# Patient Record
Sex: FEMALE | Race: WHITE | NOT HISPANIC OR LATINO | Employment: OTHER | URBAN - METROPOLITAN AREA
[De-identification: names, ages, dates, MRNs, and addresses within clinical notes are randomized per-mention and may not be internally consistent; named-entity substitution may affect disease eponyms.]

---

## 2018-10-05 ENCOUNTER — RECORDS - HEALTHEAST (OUTPATIENT)
Dept: LAB | Facility: CLINIC | Age: 83
End: 2018-10-05

## 2018-10-05 LAB
ANION GAP SERPL CALCULATED.3IONS-SCNC: 10 MMOL/L (ref 5–18)
BUN SERPL-MCNC: 44 MG/DL (ref 8–28)
CALCIUM SERPL-MCNC: 9.3 MG/DL (ref 8.5–10.5)
CHLORIDE BLD-SCNC: 107 MMOL/L (ref 98–107)
CO2 SERPL-SCNC: 27 MMOL/L (ref 22–31)
CREAT SERPL-MCNC: 2.32 MG/DL (ref 0.6–1.1)
GFR SERPL CREATININE-BSD FRML MDRD: 20 ML/MIN/1.73M2
GLUCOSE BLD-MCNC: 70 MG/DL (ref 70–125)
POTASSIUM BLD-SCNC: 4.3 MMOL/L (ref 3.5–5)
SODIUM SERPL-SCNC: 144 MMOL/L (ref 136–145)

## 2018-11-28 ENCOUNTER — RECORDS - HEALTHEAST (OUTPATIENT)
Dept: LAB | Facility: CLINIC | Age: 83
End: 2018-11-28

## 2018-11-28 LAB
ANION GAP SERPL CALCULATED.3IONS-SCNC: 6 MMOL/L (ref 5–18)
BUN SERPL-MCNC: 45 MG/DL (ref 8–28)
CALCIUM SERPL-MCNC: 9.6 MG/DL (ref 8.5–10.5)
CHLORIDE BLD-SCNC: 106 MMOL/L (ref 98–107)
CO2 SERPL-SCNC: 30 MMOL/L (ref 22–31)
CREAT SERPL-MCNC: 2.47 MG/DL (ref 0.6–1.1)
GFR SERPL CREATININE-BSD FRML MDRD: 18 ML/MIN/1.73M2
GLUCOSE BLD-MCNC: 82 MG/DL (ref 70–125)
POTASSIUM BLD-SCNC: 4.3 MMOL/L (ref 3.5–5)
SODIUM SERPL-SCNC: 142 MMOL/L (ref 136–145)

## 2019-01-25 ENCOUNTER — RECORDS - HEALTHEAST (OUTPATIENT)
Dept: LAB | Facility: CLINIC | Age: 84
End: 2019-01-25

## 2019-01-25 LAB
ANION GAP SERPL CALCULATED.3IONS-SCNC: 9 MMOL/L (ref 5–18)
BUN SERPL-MCNC: 29 MG/DL (ref 8–28)
CALCIUM SERPL-MCNC: 9.1 MG/DL (ref 8.5–10.5)
CHLORIDE BLD-SCNC: 107 MMOL/L (ref 98–107)
CO2 SERPL-SCNC: 26 MMOL/L (ref 22–31)
CREAT SERPL-MCNC: 2.1 MG/DL (ref 0.6–1.1)
GFR SERPL CREATININE-BSD FRML MDRD: 22 ML/MIN/1.73M2
GLUCOSE BLD-MCNC: 109 MG/DL (ref 70–125)
POTASSIUM BLD-SCNC: 4.1 MMOL/L (ref 3.5–5)
SODIUM SERPL-SCNC: 142 MMOL/L (ref 136–145)

## 2019-10-28 ENCOUNTER — RECORDS - HEALTHEAST (OUTPATIENT)
Dept: LAB | Facility: CLINIC | Age: 84
End: 2019-10-28

## 2019-10-28 LAB
ANION GAP SERPL CALCULATED.3IONS-SCNC: 9 MMOL/L (ref 5–18)
BUN SERPL-MCNC: 33 MG/DL (ref 8–28)
CALCIUM SERPL-MCNC: 9.5 MG/DL (ref 8.5–10.5)
CHLORIDE BLD-SCNC: 104 MMOL/L (ref 98–107)
CO2 SERPL-SCNC: 29 MMOL/L (ref 22–31)
CREAT SERPL-MCNC: 2.17 MG/DL (ref 0.6–1.1)
ERYTHROCYTE [DISTWIDTH] IN BLOOD BY AUTOMATED COUNT: 13.4 % (ref 11–14.5)
GFR SERPL CREATININE-BSD FRML MDRD: 21 ML/MIN/1.73M2
GLUCOSE BLD-MCNC: 78 MG/DL (ref 70–125)
HCT VFR BLD AUTO: 36.6 % (ref 35–47)
HGB BLD-MCNC: 11.6 G/DL (ref 12–16)
MCH RBC QN AUTO: 32.8 PG (ref 27–34)
MCHC RBC AUTO-ENTMCNC: 31.7 G/DL (ref 32–36)
MCV RBC AUTO: 103 FL (ref 80–100)
PLATELET # BLD AUTO: 189 THOU/UL (ref 140–440)
PMV BLD AUTO: 11 FL (ref 8.5–12.5)
POTASSIUM BLD-SCNC: 4.1 MMOL/L (ref 3.5–5)
RBC # BLD AUTO: 3.54 MILL/UL (ref 3.8–5.4)
SODIUM SERPL-SCNC: 142 MMOL/L (ref 136–145)
TSH SERPL DL<=0.005 MIU/L-ACNC: 0.43 UIU/ML (ref 0.3–5)
WBC: 6.7 THOU/UL (ref 4–11)

## 2019-11-07 ENCOUNTER — HEALTH MAINTENANCE LETTER (OUTPATIENT)
Age: 84
End: 2019-11-07

## 2019-11-14 ENCOUNTER — AMBULATORY - HEALTHEAST (OUTPATIENT)
Dept: OTHER | Facility: CLINIC | Age: 84
End: 2019-11-14

## 2019-11-14 ENCOUNTER — DOCUMENTATION ONLY (OUTPATIENT)
Dept: OTHER | Facility: CLINIC | Age: 84
End: 2019-11-14

## 2020-01-01 ENCOUNTER — RECORDS - HEALTHEAST (OUTPATIENT)
Dept: LAB | Facility: CLINIC | Age: 85
End: 2020-01-01

## 2020-01-01 ENCOUNTER — HEALTH MAINTENANCE LETTER (OUTPATIENT)
Age: 85
End: 2020-01-01

## 2020-01-01 LAB
ALBUMIN UR-MCNC: NEGATIVE MG/DL
ANION GAP SERPL CALCULATED.3IONS-SCNC: 10 MMOL/L (ref 5–18)
APPEARANCE UR: CLEAR
BACTERIA #/AREA URNS HPF: ABNORMAL HPF
BACTERIA SPEC CULT: NO GROWTH
BASOPHILS # BLD AUTO: 0.2 THOU/UL (ref 0–0.2)
BASOPHILS NFR BLD AUTO: 2 % (ref 0–2)
BILIRUB UR QL STRIP: NEGATIVE
BUN SERPL-MCNC: 46 MG/DL (ref 8–28)
CALCIUM SERPL-MCNC: 9 MG/DL (ref 8.5–10.5)
CHLORIDE BLD-SCNC: 106 MMOL/L (ref 98–107)
CO2 SERPL-SCNC: 25 MMOL/L (ref 22–31)
COLOR UR AUTO: ABNORMAL
CREAT SERPL-MCNC: 2.62 MG/DL (ref 0.6–1.1)
EOSINOPHIL # BLD AUTO: 0.9 THOU/UL (ref 0–0.4)
EOSINOPHIL NFR BLD AUTO: 14 % (ref 0–6)
ERYTHROCYTE [DISTWIDTH] IN BLOOD BY AUTOMATED COUNT: 13.2 % (ref 11–14.5)
GFR SERPL CREATININE-BSD FRML MDRD: 17 ML/MIN/1.73M2
GLUCOSE BLD-MCNC: 86 MG/DL (ref 70–125)
GLUCOSE UR STRIP-MCNC: NEGATIVE MG/DL
HCT VFR BLD AUTO: 34.4 % (ref 35–47)
HGB BLD-MCNC: 10.8 G/DL (ref 12–16)
HGB UR QL STRIP: ABNORMAL
KETONES UR STRIP-MCNC: NEGATIVE MG/DL
LEUKOCYTE ESTERASE UR QL STRIP: ABNORMAL
LYMPHOCYTES # BLD AUTO: 2.4 THOU/UL (ref 0.8–4.4)
LYMPHOCYTES NFR BLD AUTO: 37 % (ref 20–40)
MCH RBC QN AUTO: 32.4 PG (ref 27–34)
MCHC RBC AUTO-ENTMCNC: 31.4 G/DL (ref 32–36)
MCV RBC AUTO: 103 FL (ref 80–100)
MONOCYTES # BLD AUTO: 0.9 THOU/UL (ref 0–0.9)
MONOCYTES NFR BLD AUTO: 14 % (ref 2–10)
NEUTROPHILS # BLD AUTO: 2.2 THOU/UL (ref 2–7.7)
NEUTROPHILS NFR BLD AUTO: 33 % (ref 50–70)
NITRATE UR QL: NEGATIVE
PH UR STRIP: 5.5 [PH] (ref 4.5–8)
PLATELET # BLD AUTO: 184 THOU/UL (ref 140–440)
PMV BLD AUTO: 11 FL (ref 8.5–12.5)
POTASSIUM BLD-SCNC: 3.6 MMOL/L (ref 3.5–5)
RBC # BLD AUTO: 3.33 MILL/UL (ref 3.8–5.4)
RBC #/AREA URNS AUTO: ABNORMAL HPF
SODIUM SERPL-SCNC: 141 MMOL/L (ref 136–145)
SP GR UR STRIP: 1.01 (ref 1–1.03)
SQUAMOUS #/AREA URNS AUTO: ABNORMAL LPF
TSH SERPL DL<=0.005 MIU/L-ACNC: 2.48 UIU/ML (ref 0.3–5)
UROBILINOGEN UR STRIP-ACNC: ABNORMAL
WBC #/AREA URNS AUTO: ABNORMAL HPF
WBC: 6.6 THOU/UL (ref 4–11)

## 2020-02-23 ENCOUNTER — HEALTH MAINTENANCE LETTER (OUTPATIENT)
Age: 85
End: 2020-02-23

## 2020-04-22 ENCOUNTER — RECORDS - HEALTHEAST (OUTPATIENT)
Dept: LAB | Facility: CLINIC | Age: 85
End: 2020-04-22

## 2020-04-22 LAB
SARS-COV-2 PCR COMMENT: NORMAL
SARS-COV-2 RNA SPEC QL NAA+PROBE: NEGATIVE
SARS-COV-2 VIRUS SPECIMEN SOURCE: NORMAL

## 2021-01-01 ENCOUNTER — RECORDS - HEALTHEAST (OUTPATIENT)
Dept: ADMINISTRATIVE | Facility: CLINIC | Age: 86
End: 2021-01-01

## 2021-01-01 ENCOUNTER — RECORDS - HEALTHEAST (OUTPATIENT)
Dept: LAB | Facility: CLINIC | Age: 86
End: 2021-01-01

## 2021-01-01 ENCOUNTER — COMMUNICATION - HEALTHEAST (OUTPATIENT)
Dept: GERIATRICS | Facility: CLINIC | Age: 86
End: 2021-01-01

## 2021-01-01 ENCOUNTER — AMBULATORY - HEALTHEAST (OUTPATIENT)
Dept: GERIATRICS | Facility: CLINIC | Age: 86
End: 2021-01-01

## 2021-01-01 ENCOUNTER — HEALTH MAINTENANCE LETTER (OUTPATIENT)
Age: 86
End: 2021-01-01

## 2021-01-01 ENCOUNTER — OFFICE VISIT - HEALTHEAST (OUTPATIENT)
Dept: GERIATRICS | Facility: CLINIC | Age: 86
End: 2021-01-01

## 2021-01-01 DIAGNOSIS — G89.29 CHRONIC BILATERAL BACK PAIN, UNSPECIFIED BACK LOCATION: ICD-10-CM

## 2021-01-01 DIAGNOSIS — R26.2 INABILITY TO AMBULATE DUE TO MULTIPLE JOINTS: ICD-10-CM

## 2021-01-01 DIAGNOSIS — M54.9 CHRONIC BILATERAL BACK PAIN, UNSPECIFIED BACK LOCATION: ICD-10-CM

## 2021-01-01 DIAGNOSIS — E03.9 ACQUIRED HYPOTHYROIDISM: ICD-10-CM

## 2021-01-01 DIAGNOSIS — N18.4 CKD (CHRONIC KIDNEY DISEASE) STAGE 4, GFR 15-29 ML/MIN (H): ICD-10-CM

## 2021-01-01 DIAGNOSIS — I10 ESSENTIAL HYPERTENSION: ICD-10-CM

## 2021-01-01 DIAGNOSIS — R25.9 ABNORMAL INVOLUNTARY MOVEMENT: ICD-10-CM

## 2021-01-01 LAB
25(OH)D3 SERPL-MCNC: 39 NG/ML (ref 30–80)
ALBUMIN UR-MCNC: NEGATIVE G/DL
ALBUMIN UR-MCNC: NEGATIVE MG/DL
ANION GAP SERPL CALCULATED.3IONS-SCNC: 10 MMOL/L (ref 5–18)
ANION GAP SERPL CALCULATED.3IONS-SCNC: 7 MMOL/L (ref 5–18)
APPEARANCE UR: CLEAR
APPEARANCE UR: CLEAR
BACTERIA #/AREA URNS HPF: ABNORMAL /[HPF]
BACTERIA #/AREA URNS HPF: ABNORMAL HPF
BACTERIA SPEC CULT: NO GROWTH
BACTERIA SPEC CULT: NO GROWTH
BASOPHILS # BLD AUTO: 0.1 THOU/UL (ref 0–0.2)
BASOPHILS NFR BLD AUTO: 2 % (ref 0–2)
BILIRUB UR QL STRIP: NEGATIVE
BILIRUB UR QL STRIP: NEGATIVE
BUN SERPL-MCNC: 33 MG/DL (ref 8–28)
BUN SERPL-MCNC: 39 MG/DL (ref 8–28)
CALCIUM SERPL-MCNC: 8.9 MG/DL (ref 8.5–10.5)
CALCIUM SERPL-MCNC: 9.3 MG/DL (ref 8.5–10.5)
CHLORIDE BLD-SCNC: 108 MMOL/L (ref 98–107)
CHLORIDE BLD-SCNC: 108 MMOL/L (ref 98–107)
CO2 SERPL-SCNC: 24 MMOL/L (ref 22–31)
CO2 SERPL-SCNC: 28 MMOL/L (ref 22–31)
COLOR UR AUTO: ABNORMAL
COLOR UR AUTO: ABNORMAL
CREAT SERPL-MCNC: 1.94 MG/DL (ref 0.6–1.1)
CREAT SERPL-MCNC: 2.04 MG/DL (ref 0.6–1.1)
EOSINOPHIL # BLD AUTO: 0.8 THOU/UL (ref 0–0.4)
EOSINOPHIL NFR BLD AUTO: 12 % (ref 0–6)
ERYTHROCYTE [DISTWIDTH] IN BLOOD BY AUTOMATED COUNT: 13.2 % (ref 11–14.5)
ERYTHROCYTE [DISTWIDTH] IN BLOOD BY AUTOMATED COUNT: 13.3 % (ref 11–14.5)
GFR SERPL CREATININE-BSD FRML MDRD: 23 ML/MIN/1.73M2
GFR SERPL CREATININE-BSD FRML MDRD: 24 ML/MIN/1.73M2
GLUCOSE BLD-MCNC: 79 MG/DL (ref 70–125)
GLUCOSE BLD-MCNC: 85 MG/DL (ref 70–125)
GLUCOSE UR STRIP-MCNC: NEGATIVE MG/DL
GLUCOSE UR STRIP-MCNC: NEGATIVE MG/DL
HCT VFR BLD AUTO: 34.5 % (ref 35–47)
HCT VFR BLD AUTO: 35 % (ref 35–47)
HGB BLD-MCNC: 11.1 G/DL (ref 12–16)
HGB BLD-MCNC: 11.5 G/DL (ref 12–16)
HGB UR QL STRIP: NEGATIVE
HGB UR QL STRIP: NEGATIVE
HYALINE CASTS #/AREA URNS LPF: ABNORMAL LPF
HYALINE CASTS: 12 LPF
IMM GRANULOCYTES # BLD: 0 THOU/UL
IMM GRANULOCYTES NFR BLD: 0 %
KETONES UR STRIP-MCNC: NEGATIVE MG/DL
KETONES UR STRIP-MCNC: NEGATIVE MG/DL
LEUKOCYTE ESTERASE UR QL STRIP: ABNORMAL
LEUKOCYTE ESTERASE UR QL STRIP: ABNORMAL
LYMPHOCYTES # BLD AUTO: 2.9 THOU/UL (ref 0.8–4.4)
LYMPHOCYTES NFR BLD AUTO: 44 % (ref 20–40)
MCH RBC QN AUTO: 32.7 PG (ref 27–34)
MCH RBC QN AUTO: 33.8 PG (ref 27–34)
MCHC RBC AUTO-ENTMCNC: 31.7 G/DL (ref 32–36)
MCHC RBC AUTO-ENTMCNC: 33.3 G/DL (ref 32–36)
MCV RBC AUTO: 102 FL (ref 80–100)
MCV RBC AUTO: 103 FL (ref 80–100)
MONOCYTES # BLD AUTO: 0.7 THOU/UL (ref 0–0.9)
MONOCYTES NFR BLD AUTO: 11 % (ref 2–10)
MUCOUS THREADS #/AREA URNS LPF: PRESENT LPF
NEUTROPHILS # BLD AUTO: 2 THOU/UL (ref 2–7.7)
NEUTROPHILS NFR BLD AUTO: 31 % (ref 50–70)
NITRATE UR QL: NEGATIVE
NITRATE UR QL: NEGATIVE
PH UR STRIP: 5 [PH] (ref 4.5–8)
PH UR STRIP: 5.5 [PH] (ref 5–8)
PLATELET # BLD AUTO: 166 THOU/UL (ref 140–440)
PLATELET # BLD AUTO: 193 THOU/UL (ref 140–440)
PMV BLD AUTO: 10.8 FL (ref 8.5–12.5)
PMV BLD AUTO: 11.1 FL (ref 8.5–12.5)
POTASSIUM BLD-SCNC: 3.5 MMOL/L (ref 3.5–5)
POTASSIUM BLD-SCNC: 3.8 MMOL/L (ref 3.5–5)
RBC # BLD AUTO: 3.39 MILL/UL (ref 3.8–5.4)
RBC # BLD AUTO: 3.4 MILL/UL (ref 3.8–5.4)
RBC #/AREA URNS AUTO: ABNORMAL HPF
RBC URINE: 3 HPF
SARS-COV-2 PCR COMMENT: NORMAL
SARS-COV-2 RNA SPEC QL NAA+PROBE: NEGATIVE
SARS-COV-2 VIRUS SPECIMEN SOURCE: NORMAL
SODIUM SERPL-SCNC: 142 MMOL/L (ref 136–145)
SODIUM SERPL-SCNC: 143 MMOL/L (ref 136–145)
SP GR UR STRIP: 1.01 (ref 1–1.03)
SP GR UR STRIP: 1.02 (ref 1–1.03)
SQUAMOUS #/AREA URNS AUTO: ABNORMAL LPF
SQUAMOUS EPITHELIAL: <1 /HPF
TRANS CELLS #/AREA URNS HPF: ABNORMAL LPF
TSH SERPL DL<=0.005 MIU/L-ACNC: 1.25 UIU/ML (ref 0.3–5)
TSH SERPL DL<=0.005 MIU/L-ACNC: 2.01 UIU/ML (ref 0.3–5)
UROBILINOGEN UR STRIP-ACNC: ABNORMAL
UROBILINOGEN UR STRIP-ACNC: ABNORMAL
WBC #/AREA URNS AUTO: ABNORMAL HPF
WBC URINE: 17 HPF
WBC: 6.5 THOU/UL (ref 4–11)
WBC: 8.3 THOU/UL (ref 4–11)

## 2021-01-01 ASSESSMENT — MIFFLIN-ST. JEOR: SCORE: 831.34

## 2021-06-25 ENCOUNTER — AMBULATORY - HEALTHEAST (OUTPATIENT)
Dept: GERIATRICS | Facility: CLINIC | Age: 86
End: 2021-06-25

## 2021-06-25 NOTE — TELEPHONE ENCOUNTER
[facility] Nurse Triage Telephone Note      Provider: SASKIA Almanzar  Facility: [facility] Type: LTC    Caller: Matthew   Call Back Number:  [phone]    Allergies: Atenolol    Reason for call: Pt admitted yesterday from the [facility] yesterday and the family is requesting that the MD/NP see the pt today for pain issues. Currently the pt is on oxycodone 5mg q4h and two times a day prn and Fentanyl 25mcg daily. Per nursing the pt's painis controlled at this time.     Verbal Order/Direction given by Provider: The NP and MD won't be in the building until next week, if the pt's pain is not controlled call back or send to ER.    Provider giving order: SSAKIA Almanzar    Verbal order given to: Matthew Stewart RN

## 2021-06-26 NOTE — PROGRESS NOTES
[facility] Care note      Patient: Mary Melchor  MRN: [id number]      [facility] [[id number]]  Reason for Visit     Chief Complaint   Patient presents with     H & P       Code Status     DNR/DNI    Assessment     Acute on chronic pain  Underlying history of compression fractures of the spine  Chronic kidney disease stage IV  Movement disorder  Dysphagia on a modified diet  Profound weakness with debilitation      Plan     Patient has been moved to long-term care and was examined in the presence of her daughter who supplemented her history because of hearing loss and communication deficits.  She is a resident of assisted living facility.  Family has elected to move her to long-term care in light of her increasing needs and cares.  Pain management remains a challenge and family has been on hospice.  Hospice has been closely involved in her care and she has been started on fentanyl patch and a higher dose of narcotics.  Patient continues to endorse that she is still not well controlled with her pain.  This was reviewed with both patient and her daughter and at this point will defer to hospice for further adjustment.  She is not a candidate for an aggressive treatments in light of increasing frailty.  Apparently suspected to have pain from compression fractures but did not tolerate x-rays either.  Oral intake remains fair to poor and patient overall has been declining.  She has become increasingly bedbound  Communication is impaired due to hearing loss.  Family is excepting of her decline and has agreed to sign her onto hospice.  In light of her decline  Continue with her current care plan family especially her daughter is in agreement on moving her to long-term care as she needs a higher level of care    History     Patient is a very pleasant 89 y.o. female who is admitted to LTC  Patient is a resident of assisted living facility who has been moved to long-term care in light of increasing  needs.  Examined in the presence of her daughter.  Essentially she has chronic back pain and diffuse aches and pains with history of compression fractures of her spine.  Pain management remains a challenge and family has been contacting hospice because of increased pain concerns she was put on fentanyl patch and a higher dose of oxycodone.  She also has an underlying history of movement disorder due to which she is constantly moving in her chair.  This has been ongoing for some time.    She also endorses a history of weight loss and overall failure to thrive due to which family has elected to move her to hospice cares  She remains on a modified diet      Past Medical History     Active Ambulatory (Non-Hospital) Problems    Diagnosis     Hypoxia     Nausea & vomiting     Gastroenteritis     Renal insufficiency     CKD (chronic kidney disease) stage 4, GFR 15-29 ml/min (H)     Sacral ulcer, limited to breakdown of skin (H)     Closed compression fracture of L3 vertebra (H)     Depression, major, single episode, moderate (H)     Seasonal allergies     Chronic pain     Controlled substance agreement signed     Medical home     Back pain     Compression fracture of lumbar vertebra (H)     Degenerative joint disease (DJD) of lumbar spine     Inability to ambulate due to multiple joints     ACP (advance care planning)     Dysarthria     C. difficile enteritis     ARF (acute renal failure) (H)     Bilateral sensorineural hearing loss     Aneurysm of abdominal vessel (H)     Esophageal reflux     Essential hypertension     Hypothyroidism     Mitral valve disease     Abnormal involuntary movement     Abnormality of gait     Arthropathy     Mixed hyperlipidemia     Past Medical History:   Diagnosis Date     Abnormal involuntary movement 05/16/2008     Abnormality of gait 05/16/2008     ACP (advance care planning) 10/22/2014     Aneurysm of abdominal vessel (H) 05/16/2008     ARF (acute renal failure) (H) 05/25/2010      Arthritis      Arthropathy 05/16/2008     Back pain 10/22/2014     Bilateral sensorineural hearing loss 06/05/2009     C. difficile enteritis 05/25/2010     Chronic pain 06/12/2015     CKD (chronic kidney disease) stage 4, GFR 15-29 ml/min (H) 08/17/2018     Closed compression fracture of L3 vertebra (H) 05/18/2018     Compression fracture of lumbar vertebra (H) 10/22/2014     Controlled substance agreement signed 06/12/2015     Degenerative joint disease (DJD) of lumbar spine 10/22/2014     Depression, major, single episode, moderate (H) 10/28/2016     Disease of thyroid gland      Dysarthria 03/04/2014     Esophageal reflux 05/16/2008     Essential hypertension 05/16/2008     Gastroenteritis 01/14/2019     Hypertension      Hypothyroidism 05/16/2008     Hypoxia      Inability to ambulate due to multiple joints 10/22/2014     Medical home 10/22/2014     Mitral valve disease 05/16/2008     Mixed hyperlipidemia 05/16/2008     Nausea & vomiting 01/14/2019     Renal insufficiency 08/21/2018     Sacral ulcer, limited to breakdown of skin (H) 08/17/2018     Seasonal allergies 07/08/2016     Unspecified hearing loss, unspecified ear 05/16/2008       Past Social History     Reviewed, and she  reports that she has never smoked. She has never used smokeless tobacco. She reports previous alcohol use. She reports that she does not use drugs.    Family History     Reviewed, and family history includes Aneurysm in her sister; Heart disease in her brother; Hypertension in her brother and sister; Other in her mother.    Medication List   Post Discharge Medication Reconciliation Status: discharge medications reconciled, continue medications without change   Current Outpatient Medications on File Prior to Visit   Medication Sig Dispense Refill     dexAMETHasone (DECADRON) 2 MG tablet Take 2 mg by mouth daily Give 1 tablet by mouth one time a day for 14 Days WITH FOOD .       fentaNYL (DURAGESIC) 50 mcg/hr Place 1 patch on the skin  every third day. Apply 50 mcg transdermally every 72 hours for pain alternate sites and remove per schedule       fentaNYL 37.5 mcg/hour PT72 Place 1 patch on the skin every third day. Apply 37.5mcg transdermally every 72 hours for pain alternating sites; place patch on back of upper arm for best absorancy.       gabapentin (NEURONTIN) 100 MG capsule Take 100 mg by mouth 2 (two) times a day. Give 1 capsule by mouth two times a day       guaiFENesin (ROBAFEN) 100 mg/5 mL syrup Take 100 mg by mouth every 6 (six) hours as needed for cough. Give 5 ml by mouth every 6 hours as needed for COUGH       guaiFENesin ER (MUCINEX) 600 mg 12 hr tablet Take 600-1,200 mg by mouth 2 (two) times a day as needed for congestion. Give 1 tablet by mouth as needed for COUGH. 1-2 TABLET BID       hyoscyamine (ANASPAZ,LEVSIN) 0.125 mg tablet Take 0.125 mg by mouth every 3 (three) hours as needed for cramping. Give1 tablet by mouth every 3 hours as needed for secretion       ketotifen (ZADITOR/ZYRTEC ITCHY EYES) 0.025 % (0.035 %) ophthalmic solution Administer 1 drop to both eyes 2 (two) times a day. Instill 1 drop in both eyes two times a day for ITCHY EYES       LORazepam (ATIVAN) 0.5 MG tablet Take 0.5 mg by mouth every 4 (four) hours as needed for anxiety. Give 0.5 mg by mouth every 4 hours as needed for Anxiety or restlessness       LORazepam (ATIVAN) 0.5 MG tablet Take 0.5 mg by mouth 2 (two) times a day. Give 0.5 mg by mouth two times a day for anxiety or restlessness       menthol (ICY HOT, MENTHOL,) 5 % PtMd Apply 1 patch topically daily as needed. Apply to SKIN topically as needed for PAIN. CHANGE QD PRN       morphine (MSIR) 15 MG tablet Take 20 mg by mouth every 4 (four) hours as needed for pain. Give 20 mg sublingually every 4 hours as needed for pain or shortness of breath. Use if unable to swallow oxycodone       Oxycodone HCl 20 mg Tab Take 20 mg by mouth every 4 (four) hours as needed. Give 20 mg by mouth every 4 hours as  "needed for pain       Oxycodone HCl 20 mg Tab Take 20 mg by mouth 2 (two) times a day. Give 20 mg by mouth two times a day for pain       pantoprazole (PROTONIX) 20 MG tablet Take 20 mg by mouth daily. Give 1 tablet by mouth one time a day for GERD       polyethylene glycol (GLYCOLAX) 17 gram/dose powder Take 17 g by mouth daily as needed. Give 17 gram by mouth as needed for CONSTIPATION QD       senna (SENOKOT) 8.6 mg tablet Take 2 tablets by mouth at bedtime. Give 2 tablet by mouth at bedtime for CONSTIPATION       No current facility-administered medications on file prior to visit.        Allergies     Allergies   Allergen Reactions     Atenolol Other (See Comments)     \"bradycardia\"  \"bradycardia\"         Review of Systems   A comprehensive review of 14 systems was done. Pertinent findings noted here and in history of present illness. All the rest negative.  Constitutional: Negative.  Negative for fever, chills, SHE HAS activity change, appetite change and fatigue.   HENT: Negative for congestion and facial swelling.  [place]  Eyes: Negative for photophobia, redness and visual disturbance.   Respiratory: Negative for cough and chest tightness.    Cardiovascular: Negative for chest pain, palpitations and leg swelling.   Gastrointestinal: Negative for nausea, diarrhea, constipation, blood in stool and abdominal distention.   Genitourinary: Negative.    Musculoskeletal: decline with needing assistance; has chronic pain  Skin: Negative.    Neurological: Negative for dizziness, tremors, syncope, weakness, light-headedness and headaches.   Hematological: Does not bruise/bleed easily.   Psychiatric/Behavioral: Negative.        Physical Exam     Recent Vitals 6/10/2021   Height 5' 0\"   Weight 112 lbs 6 oz   BSA (m2) 1.47 m2   /76   Pulse 68   Temp 97.4   Temp src -   SpO2 94   Some recent data might be hidden       Constitutional: Oriented to person, place, and time and appears well-developed.   Frail and weak  HEENT: "  Normocephalic and atraumatic.  Eyes: Conjunctivae and EOM are normal. Pupils are equal, round, and reactive to light. No discharge.  No scleral icterus. Nose normal. Mouth/Throat: Oropharynx is clear and moist. No oropharyngeal exudate.    [place]  NECK: Normal range of motion. Neck supple. No JVD present. No tracheal deviation present. No thyromegaly present.   CARDIOVASCULAR: Normal rate, regular rhythm and intact distal pulses.  Exam reveals no gallop and no friction rub.  Systolic murmur present.  PULMONARY: Effort normal and breath sounds normal. No respiratory distress.No Wheezing or rales.  ABDOMEN: Soft. Bowel sounds are normal. No distension and no mass.  There is no tenderness. There is no rebound and no guarding. No HSM.  MUSCULOSKELETAL: abNormal range of motion. No edema and has diffuse tenderness. Mild kyphosis, no tenderness.  LYMPH NODES: Has no cervical, supraclavicular, axillary and groin adenopathy.   NEUROLOGICAL: Alert and oriented to person, place, and time. No cranial nerve deficit.  Normal muscle tone. Coordination normal.   GENITOURINARY: Deferred exam.  SKIN: Skin is warm and dry. No rash noted. No erythema. No pallor.   EXTREMITIES: No cyanosis, no clubbing, no edema. No Deformity.  PSYCHIATRIC: Normal mood, affect and behavior.anxious      Lab Results     Recent Results (from the past 240 hour(s))   COVID-19 Virus PCR MRF    Collection Time: 06/09/21  6:00 PM    Specimen: Respiratory   Result Value Ref Range    COVID-19 VIRUS SPECIMEN SOURCE Nasopharyngeal     2019-nCOV       Test received-See reflex to IDDL test SARS CoV2 (COVID-19) Virus RT-PCR   SARS-CoV-2 (COVID-19)-PCR    Collection Time: 06/09/21  6:00 PM    Specimen: Respiratory   Result Value Ref Range    SARS-CoV-2 Virus Specimen Source Nasopharyngeal     SARS-CoV-2 PCR Result NEGATIVE     SARS-COV-2 PCR COMMENT       Testing was performed using the [employer] SARS-CoV-2 Assay on the [place]                Electronically signed by  Mirna  ETHEL Oquendo

## 2021-07-04 NOTE — LETTER
Letter by Mirna Oquendo MBBS at      Author: Mirna Oquendo MBBS Service: -- Author Type: --    Filed:  Encounter Date: 6/16/2021 Status: (Other)         [facility]  [address] [place] [id number]                                  June 16, 2021    Patient: Mary Melchor   MR Number: [id number]   YOB: 1927   Date of Visit: 6/16/2021     Dear Dr. Home:    Thank you for referring Mary Melchor to me for evaluation. Below are the relevant portions of my assessment and plan of care.    If you have questions, please do not hesitate to call me. I look forward to following Mary along with you.    Sincerely,        ETHEL Gonzalez          CC  No Recipients  Mirna Oquendo MBBS  6/16/2021  4:05 PM  Sign when Signing Visit    [facility] note      Patient: Mary Melchor  MRN: [id number]      [facility] [[id number]]  Reason for Visit     Chief Complaint   Patient presents with   ? H & P       Code Status     DNR/DNI    Assessment     Acute on chronic pain  Underlying history of compression fractures of the spine  Chronic kidney disease stage IV  Movement disorder  Dysphagia on a modified diet  Profound weakness with debilitation      Plan     Patient has been moved to long-term care and was examined in the presence of her daughter who supplemented her history because of hearing loss and communication deficits.  She is a resident of assisted living facility.  Family has elected to move her to long-term care in light of her increasing needs and cares.  Pain management remains a challenge and family has been on hospice.  Hospice has been closely involved in her care and she has been started on fentanyl patch and a higher dose of narcotics.  Patient continues to endorse that she is still not well controlled with her pain.  This was reviewed with both patient and her daughter and at this point will defer to hospice for further adjustment.  She is  not a candidate for an aggressive treatments in light of increasing frailty.  Apparently suspected to have pain from compression fractures but did not tolerate x-rays either.  Oral intake remains fair to poor and patient overall has been declining.  She has become increasingly bedbound  Communication is impaired due to hearing loss.  Family is excepting of her decline and has agreed to sign her onto hospice.  In light of her decline  Continue with her current care plan family especially her daughter is in agreement on moving her to long-term care as she needs a higher level of care    History     Patient is a very pleasant 89 y.o. female who is admitted to LTC  Patient is a resident of assisted living facility who has been moved to long-term care in light of increasing needs.  Examined in the presence of her daughter.  Essentially she has chronic back pain and diffuse aches and pains with history of compression fractures of her spine.  Pain management remains a challenge and family has been contacting hospice because of increased pain concerns she was put on fentanyl patch and a higher dose of oxycodone.  She also has an underlying history of movement disorder due to which she is constantly moving in her chair.  This has been ongoing for some time.    She also endorses a history of weight loss and overall failure to thrive due to which family has elected to move her to hospice cares  She remains on a modified diet      Past Medical History     Active Ambulatory (Non-Hospital) Problems    Diagnosis   ? Hypoxia   ? Nausea & vomiting   ? Gastroenteritis   ? Renal insufficiency   ? CKD (chronic kidney disease) stage 4, GFR 15-29 ml/min (H)   ? Sacral ulcer, limited to breakdown of skin (H)   ? Closed compression fracture of L3 vertebra (H)   ? Depression, major, single episode, moderate (H)   ? Seasonal allergies   ? Chronic pain   ? Controlled substance agreement signed   ? Medical home   ? Back pain   ? Compression  fracture of lumbar vertebra (H)   ? Degenerative joint disease (DJD) of lumbar spine   ? Inability to ambulate due to multiple joints   ? ACP (advance care planning)   ? Dysarthria   ? C. difficile enteritis   ? ARF (acute renal failure) (H)   ? Bilateral sensorineural hearing loss   ? Aneurysm of abdominal vessel (H)   ? Esophageal reflux   ? Essential hypertension   ? Hypothyroidism   ? Mitral valve disease   ? Abnormal involuntary movement   ? Abnormality of gait   ? Arthropathy   ? Mixed hyperlipidemia     Past Medical History:   Diagnosis Date   ? Abnormal involuntary movement 05/16/2008   ? Abnormality of gait 05/16/2008   ? ACP (advance care planning) 10/22/2014   ? Aneurysm of abdominal vessel (H) 05/16/2008   ? ARF (acute renal failure) (H) 05/25/2010   ? Arthritis    ? Arthropathy 05/16/2008   ? Back pain 10/22/2014   ? Bilateral sensorineural hearing loss 06/05/2009   ? C. difficile enteritis 05/25/2010   ? Chronic pain 06/12/2015   ? CKD (chronic kidney disease) stage 4, GFR 15-29 ml/min (H) 08/17/2018   ? Closed compression fracture of L3 vertebra (H) 05/18/2018   ? Compression fracture of lumbar vertebra (H) 10/22/2014   ? Controlled substance agreement signed 06/12/2015   ? Degenerative joint disease (DJD) of lumbar spine 10/22/2014   ? Depression, major, single episode, moderate (H) 10/28/2016   ? Disease of thyroid gland    ? Dysarthria 03/04/2014   ? Esophageal reflux 05/16/2008   ? Essential hypertension 05/16/2008   ? Gastroenteritis 01/14/2019   ? Hypertension    ? Hypothyroidism 05/16/2008   ? Hypoxia    ? Inability to ambulate due to multiple joints 10/22/2014   ? Medical home 10/22/2014   ? Mitral valve disease 05/16/2008   ? Mixed hyperlipidemia 05/16/2008   ? Nausea & vomiting 01/14/2019   ? Renal insufficiency 08/21/2018   ? Sacral ulcer, limited to breakdown of skin (H) 08/17/2018   ? Seasonal allergies 07/08/2016   ? Unspecified hearing loss, unspecified ear 05/16/2008       Past Social  History     Reviewed, and she  reports that she has never smoked. She has never used smokeless tobacco. She reports previous alcohol use. She reports that she does not use drugs.    Family History     Reviewed, and family history includes Aneurysm in her sister; Heart disease in her brother; Hypertension in her brother and sister; Other in her mother.    Medication List   Post Discharge Medication Reconciliation Status: discharge medications reconciled, continue medications without change   Current Outpatient Medications on File Prior to Visit   Medication Sig Dispense Refill   ? dexAMETHasone (DECADRON) 2 MG tablet Take 2 mg by mouth daily Give 1 tablet by mouth one time a day for 14 Days WITH FOOD .     ? fentaNYL (DURAGESIC) 50 mcg/hr Place 1 patch on the skin every third day. Apply 50 mcg transdermally every 72 hours for pain alternate sites and remove per schedule     ? fentaNYL 37.5 mcg/hour PT72 Place 1 patch on the skin every third day. Apply 37.5mcg transdermally every 72 hours for pain alternating sites; place patch on back of upper arm for best absorancy.     ? gabapentin (NEURONTIN) 100 MG capsule Take 100 mg by mouth 2 (two) times a day. Give 1 capsule by mouth two times a day     ? guaiFENesin (ROBAFEN) 100 mg/5 mL syrup Take 100 mg by mouth every 6 (six) hours as needed for cough. Give 5 ml by mouth every 6 hours as needed for COUGH     ? guaiFENesin ER (MUCINEX) 600 mg 12 hr tablet Take 600-1,200 mg by mouth 2 (two) times a day as needed for congestion. Give 1 tablet by mouth as needed for COUGH. 1-2 TABLET BID     ? hyoscyamine (ANASPAZ,LEVSIN) 0.125 mg tablet Take 0.125 mg by mouth every 3 (three) hours as needed for cramping. Give1 tablet by mouth every 3 hours as needed for secretion     ? ketotifen (ZADITOR/ZYRTEC ITCHY EYES) 0.025 % (0.035 %) ophthalmic solution Administer 1 drop to both eyes 2 (two) times a day. Instill 1 drop in both eyes two times a day for ITCHY EYES     ? LORazepam  "(ATIVAN) 0.5 MG tablet Take 0.5 mg by mouth every 4 (four) hours as needed for anxiety. Give 0.5 mg by mouth every 4 hours as needed for Anxiety or restlessness     ? LORazepam (ATIVAN) 0.5 MG tablet Take 0.5 mg by mouth 2 (two) times a day. Give 0.5 mg by mouth two times a day for anxiety or restlessness     ? menthol (ICY HOT, MENTHOL,) 5 % PtMd Apply 1 patch topically daily as needed. Apply to SKIN topically as needed for PAIN. CHANGE QD PRN     ? morphine (MSIR) 15 MG tablet Take 20 mg by mouth every 4 (four) hours as needed for pain. Give 20 mg sublingually every 4 hours as needed for pain or shortness of breath. Use if unable to swallow oxycodone     ? Oxycodone HCl 20 mg Tab Take 20 mg by mouth every 4 (four) hours as needed. Give 20 mg by mouth every 4 hours as needed for pain     ? Oxycodone HCl 20 mg Tab Take 20 mg by mouth 2 (two) times a day. Give 20 mg by mouth two times a day for pain     ? pantoprazole (PROTONIX) 20 MG tablet Take 20 mg by mouth daily. Give 1 tablet by mouth one time a day for GERD     ? polyethylene glycol (GLYCOLAX) 17 gram/dose powder Take 17 g by mouth daily as needed. Give 17 gram by mouth as needed for CONSTIPATION QD     ? senna (SENOKOT) 8.6 mg tablet Take 2 tablets by mouth at bedtime. Give 2 tablet by mouth at bedtime for CONSTIPATION       No current facility-administered medications on file prior to visit.        Allergies     Allergies   Allergen Reactions   ? Atenolol Other (See Comments)     \"bradycardia\"  \"bradycardia\"         Review of Systems   A comprehensive review of 14 systems was done. Pertinent findings noted here and in history of present illness. All the rest negative.  Constitutional: Negative.  Negative for fever, chills, SHE HAS activity change, appetite change and fatigue.   HENT: Negative for congestion and facial swelling.  [place]  Eyes: Negative for photophobia, redness and visual disturbance.   Respiratory: Negative for cough and chest tightness.  " "  Cardiovascular: Negative for chest pain, palpitations and leg swelling.   Gastrointestinal: Negative for nausea, diarrhea, constipation, blood in stool and abdominal distention.   Genitourinary: Negative.    Musculoskeletal: decline with needing assistance; has chronic pain  Skin: Negative.    Neurological: Negative for dizziness, tremors, syncope, weakness, light-headedness and headaches.   Hematological: Does not bruise/bleed easily.   Psychiatric/Behavioral: Negative.        Physical Exam     Recent Vitals 6/10/2021   Height 5' 0\"   Weight 112 lbs 6 oz   BSA (m2) 1.47 m2   /76   Pulse 68   Temp 97.4   Temp src -   SpO2 94   Some recent data might be hidden       Constitutional: Oriented to person, place, and time and appears well-developed.   Frail and weak  HEENT:  Normocephalic and atraumatic.  Eyes: Conjunctivae and EOM are normal. Pupils are equal, round, and reactive to light. No discharge.  No scleral icterus. Nose normal. Mouth/Throat: Oropharynx is clear and moist. No oropharyngeal exudate.    [place]  NECK: Normal range of motion. Neck supple. No JVD present. No tracheal deviation present. No thyromegaly present.   CARDIOVASCULAR: Normal rate, regular rhythm and intact distal pulses.  Exam reveals no gallop and no friction rub.  Systolic murmur present.  PULMONARY: Effort normal and breath sounds normal. No respiratory distress.No Wheezing or rales.  ABDOMEN: Soft. Bowel sounds are normal. No distension and no mass.  There is no tenderness. There is no rebound and no guarding. No HSM.  MUSCULOSKELETAL: abNormal range of motion. No edema and has diffuse tenderness. Mild kyphosis, no tenderness.  LYMPH NODES: Has no cervical, supraclavicular, axillary and groin adenopathy.   NEUROLOGICAL: Alert and oriented to person, place, and time. No cranial nerve deficit.  Normal muscle tone. Coordination normal.   GENITOURINARY: Deferred exam.  SKIN: Skin is warm and dry. No rash noted. No erythema. No pallor. "   EXTREMITIES: No cyanosis, no clubbing, no edema. No Deformity.  PSYCHIATRIC: Normal mood, affect and behavior.anxious      Lab Results     Recent Results (from the past 240 hour(s))   COVID-19 Virus PCR MRF    Collection Time: 06/09/21  6:00 PM    Specimen: Respiratory   Result Value Ref Range    COVID-19 VIRUS SPECIMEN SOURCE Nasopharyngeal     2019-nCOV       Test received-See reflex to IDDL test SARS CoV2 (COVID-19) Virus RT-PCR   SARS-CoV-2 (COVID-19)-PCR    Collection Time: 06/09/21  6:00 PM    Specimen: Respiratory   Result Value Ref Range    SARS-CoV-2 Virus Specimen Source Nasopharyngeal     SARS-CoV-2 PCR Result NEGATIVE     SARS-COV-2 PCR COMMENT       Testing was performed using the [employer] SARS-CoV-2 Assay on the [place]                Electronically signed by  ETHEL Gonzalez

## 2021-07-06 VITALS
OXYGEN SATURATION: 94 % | RESPIRATION RATE: 18 BRPM | DIASTOLIC BLOOD PRESSURE: 76 MMHG | SYSTOLIC BLOOD PRESSURE: 152 MMHG | HEIGHT: 60 IN | BODY MASS INDEX: 22.07 KG/M2 | HEART RATE: 68 BPM | TEMPERATURE: 97.4 F | WEIGHT: 112.4 LBS

## 2021-07-13 ENCOUNTER — RECORDS - HEALTHEAST (OUTPATIENT)
Dept: ADMINISTRATIVE | Facility: CLINIC | Age: 86
End: 2021-07-13